# Patient Record
Sex: MALE | Race: WHITE | Employment: FULL TIME | URBAN - METROPOLITAN AREA
[De-identification: names, ages, dates, MRNs, and addresses within clinical notes are randomized per-mention and may not be internally consistent; named-entity substitution may affect disease eponyms.]

---

## 2018-10-11 ENCOUNTER — HOSPITAL ENCOUNTER (EMERGENCY)
Facility: CLINIC | Age: 23
Discharge: HOME | End: 2018-10-11
Attending: EMERGENCY MEDICINE | Admitting: EMERGENCY MEDICINE
Payer: COMMERCIAL

## 2018-10-11 ENCOUNTER — APPOINTMENT (OUTPATIENT)
Dept: GENERAL RADIOLOGY | Facility: CLINIC | Age: 23
End: 2018-10-11
Payer: COMMERCIAL

## 2018-10-11 ENCOUNTER — APPOINTMENT (OUTPATIENT)
Dept: GENERAL RADIOLOGY | Facility: CLINIC | Age: 23
End: 2018-10-11
Attending: EMERGENCY MEDICINE
Payer: COMMERCIAL

## 2018-10-11 VITALS
HEART RATE: 52 BPM | SYSTOLIC BLOOD PRESSURE: 122 MMHG | DIASTOLIC BLOOD PRESSURE: 62 MMHG | TEMPERATURE: 98.4 F | RESPIRATION RATE: 18 BRPM | OXYGEN SATURATION: 97 %

## 2018-10-11 DIAGNOSIS — T17.908A ASPIRATION OF FOREIGN BODY, INITIAL ENCOUNTER: ICD-10-CM

## 2018-10-11 PROCEDURE — 99282 EMERGENCY DEPT VISIT SF MDM: CPT | Mod: Z6 | Performed by: EMERGENCY MEDICINE

## 2018-10-11 PROCEDURE — 74019 RADEX ABDOMEN 2 VIEWS: CPT

## 2018-10-11 PROCEDURE — 71046 X-RAY EXAM CHEST 2 VIEWS: CPT

## 2018-10-11 PROCEDURE — 99284 EMERGENCY DEPT VISIT MOD MDM: CPT | Performed by: EMERGENCY MEDICINE

## 2018-10-11 ASSESSMENT — ENCOUNTER SYMPTOMS
VOMITING: 0
APPETITE CHANGE: 0
FEVER: 0
SHORTNESS OF BREATH: 0
SORE THROAT: 0
ABDOMINAL PAIN: 0
COUGH: 0
NAUSEA: 0

## 2018-10-11 NOTE — ED TRIAGE NOTES
Pt presents to ED after he was at the dental clinic when a small piece of the dental students metal instrument broke off and they were unable to find it. Dentist wanted to have a chest xray taken to ensure that it wasn't aspirated. No respiratory issues at this time

## 2018-10-11 NOTE — ED AVS SNAPSHOT
[facility], [place], Emergency Department    [address] [place] [id number]    Phone:  [phone]                                       Augustine Roblero   MRN: [id number]    Department:  [facility], Emergency Department   Date of Visit:  10/11/2018           After Visit Summary Signature Page     I have received my discharge instructions, and my questions have been answered. I have discussed any challenges I see with this plan with the nurse or doctor.    ..........................................................................................................................................  Patient/Patient Representative Signature      ..........................................................................................................................................  Patient Representative Print Name and Relationship to Patient    ..................................................               ................................................  Date                                   Time    ..........................................................................................................................................  Reviewed by Signature/Title    ...................................................              ..............................................  Date                                               Time          22EPIC Rev 08/18

## 2018-10-11 NOTE — ED PROVIDER NOTES
History     Chief Complaint   Patient presents with     Radiology Visit     HPI  Augustine Roblero is a 22 year old male who presents from dentist clinic after tip of sharp dental tool broke off in his mouth at 16:00 and was not able to be retrieved - reports 1 mm fragment. Sent in for XR for concerns of aspiration. No complaints at this time, does not recall having swallowed or aspirated anything. No shortness of breath, no abdominal pain.    History of celiacs. Otherwise healthy.   No medications.    I have reviewed the Medications, Allergies, Past Medical and Surgical History, and Social History in the Epic system.    Review of Systems   Constitutional: Negative for appetite change and fever.   HENT: Negative for sore throat.    Eyes: Negative for visual disturbance.   Respiratory: Negative for cough and shortness of breath.    Cardiovascular: Negative for chest pain.   Gastrointestinal: Negative for abdominal pain, nausea and vomiting.       Physical Exam   BP: 127/68  Pulse: 52  Heart Rate: 50  Temp: 98.4  F (36.9  C)  Resp: 16  SpO2: 97 %      Physical Exam   Constitutional: He is oriented to person, place, and time. He appears well-developed and well-nourished. No distress.   HENT:   Head: Normocephalic and atraumatic.   Eyes: Conjunctivae and EOM are normal.   Neck: Normal range of motion.   Cardiovascular: Normal rate, regular rhythm and normal heart sounds.    Pulmonary/Chest: Effort normal and breath sounds normal. No respiratory distress. He has no wheezes. He has no rales. He exhibits no tenderness.   Abdominal: Soft. Bowel sounds are normal. There is no tenderness.   Musculoskeletal: Normal range of motion.   Neurological: He is alert and oriented to person, place, and time.   Skin: Skin is warm. He is not diaphoretic.   Psychiatric: He has a normal mood and affect.       ED Course     ED Course     Procedures           Labs Ordered and Resulted from Time of ED Arrival Up to the Time of Departure from  the ED - No data to display         Assessments & Plan (with Medical Decision Making)   23 yo M presenting from dentist clinic for concern of aspiration/swallowing of 1 mm metallic fragment from dental tool. Patient without complaints at this time. VS normal. Exam unremarkable. CXR and AXR negative for foreign bodies. Remains hemodynamically stable and safe for discharge to home. Return precautions given.    I have reviewed the nursing notes.    I have reviewed the findings, diagnosis, plan and need for follow up with the patient.    There are no discharge medications for this patient.      Final diagnoses:   Aspiration of foreign body, initial encounter       10/11/2018   [facility], [place], EMERGENCY DEPARTMENT    This data collected with the Resident working in the Emergency Department.  The history and physical exam as well as results of work up and plan of care was discussed with the resident. Patient left the ED before I was able to physically see them. Given small size of potential FB w/o acute symptoms or globus sensation, no indication for further work up. Return precautions were provided. The key portions of the note including the entire assessment and plan reflect my documentation.           Jessi Vaughn MD  10/18/18 [id number]

## 2018-10-11 NOTE — DISCHARGE INSTRUCTIONS
We saw you today to rule out swallowed/aspirated foreign body.  X rays of the chest and abdomen were negative.  Please return if you experience symptoms of significant abdominal pain, chest pain, shortness of breath.

## 2018-10-11 NOTE — ED AVS SNAPSHOT
[facility], Emergency Department    [address] [address]    Phone:  [phone]                                       Augustine Roblero   MRN: [id number]    Department:  [facility], Emergency Department   Date of Visit:  10/11/2018           Patient Information     Date Of Birth          1995        Your diagnoses for this visit were:     None       You were seen by Jessi Vaughn MD.        Discharge Instructions       We saw you today to rule out swallowed/aspirated foreign body.  X rays of the chest and abdomen were negative.  Please return if you experience symptoms of significant abdominal pain, chest pain, shortness of breath.    24 Hour Appointment Hotline       To make an appointment at any [facility] clinic, call [phone] ([phone]). If you don't have a family doctor or clinic, we will help you find one. [facility] clinics are conveniently located to serve the needs of you and your family.             Review of your medicines      Notice     You have not been prescribed any medications.            Procedures and tests performed during your visit     Abdomen XR, 2 vw, flat and upright    Chest XR,  PA & LAT      Orders Needing Specimen Collection     None      Pending Results     No orders found from 10/9/2018 to 10/12/2018.            Pending Culture Results     No orders found from 10/9/2018 to 10/12/2018.            Pending Results Instructions     If you had any lab results that were not finalized at the time of your Discharge, you can call the ED Lab Result RN at [phone]. You will be contacted by this team for any positive Lab results or changes in treatment. The nurses are available 7 days a week from 10A to 6:30P.  You can leave a message 24 hours per day and they will return your call.        Thank you for choosing [facility]       Thank you for choosing [facility] for your care. Our goal is always to provide you with excellent care. Hearing back from our  "patients is one way we can continue to improve our services. Please take a few minutes to complete the written survey that you may receive in the mail after you visit with us. Thank you!        [facility] Information     [employer] lets you send messages to your doctor, view your test results, renew your prescriptions, schedule appointments and more. To sign up, go to [email or web address] . Click on \"Log in\" on the left side of the screen, which will take you to the Welcome page. Then click on \"Sign up Now\" on the right side of the page.     You will be asked to enter the access code listed below, as well as some personal information. Please follow the directions to create your username and password.     Your access code is: [email or web address]  Expires: 2019  5:50 PM     Your access code will  in 90 days. If you need help or a new code, please call your [facility] clinic or [phone].        Care EveryWhere ID     This is your Care EveryWhere ID. This could be used by other organizations to access your [facility] medical records  [id number]        Equal Access to Services     GONZALO ZAYAS : Hadnisa Cordova, haile jones, mark lamb, shannon call. So [facility] [phone].    ATENCIÓN: Si habla español, tiene a henderson disposición servicios gratuitos de asistencia lingüística. Micki al [phone].    We comply with applicable federal civil rights laws and [place] laws. We do not discriminate on the basis of race, color, national origin, age, disability, sex, sexual orientation, or gender identity.            After Visit Summary       This is your record. Keep this with you and show to your community pharmacist(s) and doctor(s) at your next visit.                  "